# Patient Record
Sex: FEMALE | Race: WHITE | URBAN - METROPOLITAN AREA
[De-identification: names, ages, dates, MRNs, and addresses within clinical notes are randomized per-mention and may not be internally consistent; named-entity substitution may affect disease eponyms.]

---

## 2018-06-25 ENCOUNTER — OFFICE VISIT (OUTPATIENT)
Dept: FAMILY MEDICINE CLINIC | Age: 28
End: 2018-06-25

## 2018-06-25 VITALS
HEART RATE: 104 BPM | HEIGHT: 60 IN | BODY MASS INDEX: 43.51 KG/M2 | SYSTOLIC BLOOD PRESSURE: 125 MMHG | TEMPERATURE: 99.2 F | WEIGHT: 221.6 LBS | RESPIRATION RATE: 19 BRPM | OXYGEN SATURATION: 96 % | DIASTOLIC BLOOD PRESSURE: 75 MMHG

## 2018-06-25 DIAGNOSIS — R53.83 FATIGUE, UNSPECIFIED TYPE: Primary | ICD-10-CM

## 2018-06-25 DIAGNOSIS — N92.6 MISSED PERIOD: ICD-10-CM

## 2018-06-25 PROBLEM — E66.01 OBESITY, MORBID (HCC): Status: ACTIVE | Noted: 2018-06-25

## 2018-06-25 RX ORDER — ACETAMINOPHEN AND CODEINE PHOSPHATE 120; 12 MG/5ML; MG/5ML
1 SOLUTION ORAL DAILY
COMMUNITY

## 2018-06-25 NOTE — MR AVS SNAPSHOT
[address] [facility] [address] 
[phone] Patient: iQng Kwon MRN: [id number] :1990 Visit Information Date & Time Provider Department Dept. Phone Encounter #  
 2018  3:30 PM Kamini Owen, [address] [facility] [phone] [id number] Upcoming Health Maintenance Date Due DTaP/Tdap/Td series (1 - Tdap) 2012 PAP AKA CERVICAL CYTOLOGY 2012 Influenza Age 5 to Adult 2018 Allergies as of 2018  Never Reviewed Not on File Current Immunizations  Never Reviewed No immunizations on file. Not reviewed this visit You Were Diagnosed With   
  
 Codes Comments Fatigue, unspecified type    -  Primary ICD-10-CM: R53.83 ICD-9-CM: 780.79 Missed period     ICD-10-CM: N92.6 ICD-9-CM: 626.4 Vitals BP Pulse Temp Resp Height(growth percentile) Weight(growth percentile) 125/75 (BP 1 Location: Left arm, BP Patient Position: Sitting) (!) 104 99.2 °F (37.3 °C) (Oral) 19 4' 11.5\" (1.511 m) 221 lb 9.6 oz (100.5 kg) LMP SpO2 BMI OB Status Smoking Status 2018 (Exact Date) 96% 44.01 kg/m2 Unknown Former Smoker Vitals History BMI and BSA Data Body Mass Index Body Surface Area 44.01 kg/m 2 2.05 m 2 Your Updated Medication List  
  
   
This list is accurate as of 18  4:40 PM.  Always use your most recent med list.  
  
  
  
  
 norethindrone 0.35 mg Tab Commonly known as:  [employer] Take 1 Tab by mouth daily. Taking [place] We Performed the Following AMB POC URINE PREGNANCY TEST, VISUAL COLOR COMPARISON [15001 CPT(R)] CBC W/O DIFF [66226 CPT(R)] REFERRAL TO PSYCHIATRY [REF91 Custom] Comments:  
 Please evaluate patient for eval for ADD. Per patient was diagnosed as a kid but has not proof of diagnoses. TSH AND FREE T4 [21792 CPT(R)] To-Do List   
 06/25/2018 Lab:  CBC W/O DIFF   
  
 06/25/2018 Lab:  TSH AND FREE T4 Referral Information Referral ID Referred By Referred To  
  
 [id number] Vijaya Lloyd Not Available Visits Status Start Date End Date 1 New Request 6/25/18 6/25/19 If your referral has a status of pending review or denied, additional information will be sent to support the outcome of this decision. Introducing [facility]! Shayy Mckeon introduces [employer] patient portal. Now you can access parts of your medical record, email your doctor's office, and request medication refills online. 1. In your internet browser, go to [email or web address] 2. Click on the First Time User? Click Here link in the Sign In box. You will see the New Member Sign Up page. 3. Enter your [employer] Access Code exactly as it appears below. You will not need to use this code after youve completed the sign-up process. If you do not sign up before the expiration date, you must request a new code. · [employer] Access Code: [email or web address] Expires: 9/23/2018  4:09 PM 
 
4. Enter the last four digits of your Social Security Number (xxxx) and Date of Birth (mm/dd/yyyy) as indicated and click Submit. You will be taken to the next sign-up page. 5. Create a [employer] ID. This will be your [employer] login ID and cannot be changed, so think of one that is secure and easy to remember. 6. Create a [employer] password. You can change your password at any time. 7. Enter your Password Reset Question and Answer. This can be used at a later time if you forget your password. 8. Enter your e-mail address. You will receive e-mail notification when new information is available in [address] 9. Click Sign Up. You can now view and download portions of your medical record. 10. Click the Download Summary menu link to download a portable copy of your medical information. If you have questions, please visit the Frequently Asked Questions section of the [employer] website. Remember, [employer] is NOT to be used for urgent needs. For medical emergencies, dial 911. Now available from your iPhone and Android! Please provide this summary of care documentation to your next provider. If you have any questions after today's visit, please call [phone].

## 2018-06-26 LAB
ERYTHROCYTE [DISTWIDTH] IN BLOOD BY AUTOMATED COUNT: 13.9 % (ref 10–15.5)
HCT VFR BLD AUTO: 43.5 % (ref 35.1–46.5)
HGB BLD-MCNC: 14.2 G/DL (ref 11.7–15.5)
MCH RBC QN AUTO: 27 PG (ref 26–34)
MCHC RBC AUTO-ENTMCNC: 33 G/DL (ref 31–36)
MCV RBC AUTO: 83 FL (ref 80–95)
PLATELET # BLD AUTO: 382 K/UL (ref 140–440)
PMV BLD AUTO: 10.7 FL (ref 9–13)
RBC # BLD AUTO: 5.26 M/UL (ref 3.8–5.2)
T4 FREE SERPL-MCNC: 1.3 NG/DL (ref 0.9–1.8)
TSH SERPL DL<=0.005 MIU/L-ACNC: 1.66 MCU/ML (ref 0.27–4.2)
WBC # BLD AUTO: 11.9 K/UL (ref 4–11)

## 2018-06-26 NOTE — PROGRESS NOTES
HISTORY OF PRESENT ILLNESS  Uzma Taylor is a 33 y.o. female who is a new patient with a concern of fatigue. She has felt fatigued daily for several months. She will sleep for at least 8 hours every night and will still feel tired. Denies any recent illnesses or any constitutional symptoms. Denies any history of anemia or thyroid problems but does know of family history of thyroid problems. She is late for her menstrual cycle therefore days so would like to get a pressing test done. She states she has been stressed last couple months so we can just be stress causing the delayed menses. Fatigue   The history is provided by the patient. This is a new problem. The current episode started more than 1 week ago. The problem occurs daily. The problem has not changed since onset. Nothing aggravates the symptoms. Nothing relieves the symptoms. She has tried rest for the symptoms. The treatment provided no relief. Past Medical History:   Diagnosis Date    ADHD     as a child    Asthma     PCOS (polycystic ovarian syndrome)      No current outpatient prescriptions on file prior to visit. No current facility-administered medications on file prior to visit. Allergies not on file      Review of Systems   Constitutional: Positive for fatigue and malaise/fatigue. Negative for chills and fever. HENT: Negative. Eyes: Negative for blurred vision. Gastrointestinal: Negative for nausea. Genitourinary: Negative. Neurological: Negative for dizziness. Psychiatric/Behavioral: Negative. Objective  Visit Vitals    /75 (BP 1 Location: Left arm, BP Patient Position: Sitting)    Pulse (!) 104    Temp 99.2 °F (37.3 °C) (Oral)    Resp 19    Ht 4' 11.5\" (1.511 m)    Wt 221 lb 9.6 oz (100.5 kg)    LMP 05/08/2018 (Exact Date)    SpO2 96%    BMI 44.01 kg/m2       Physical Exam   Constitutional: She is oriented to person, place, and time. No distress.    Eyes: Pupils are equal, round, and reactive to light. Neck: Neck supple. No thyromegaly present. Cardiovascular: Normal rate, regular rhythm and normal heart sounds. Pulmonary/Chest: Effort normal and breath sounds normal.   Lymphadenopathy:     She has no cervical adenopathy. Neurological: She is alert and oriented to person, place, and time. ASSESSMENT and PLAN    ICD-10-CM ICD-9-CM    1. Fatigue, unspecified type R53.83 780.79 CBC W/O DIFF      TSH AND FREE T4      REFERRAL TO PSYCHIATRY      CBC W/O DIFF      TSH AND FREE T4   2. Missed period N92.6 626.4 AMB POC URINE PREGNANCY TEST, VISUAL COLOR COMPARISON     Labs ordered in regards to the fatigue. Pregnancy test done and was negative. Questions answered and patient verbalized understanding and agreed with plan.

## 2018-06-27 LAB
HCG URINE, QL. (POC): NEGATIVE
VALID INTERNAL CONTROL?: YES